# Patient Record
Sex: FEMALE | Race: WHITE | Employment: UNEMPLOYED | URBAN - METROPOLITAN AREA
[De-identification: names, ages, dates, MRNs, and addresses within clinical notes are randomized per-mention and may not be internally consistent; named-entity substitution may affect disease eponyms.]

---

## 2019-04-01 ENCOUNTER — OFFICE VISIT (OUTPATIENT)
Dept: NEUROLOGY | Age: 64
End: 2019-04-01
Payer: COMMERCIAL

## 2019-04-01 VITALS
BODY MASS INDEX: 37.15 KG/M2 | WEIGHT: 189.2 LBS | DIASTOLIC BLOOD PRESSURE: 65 MMHG | HEART RATE: 97 BPM | SYSTOLIC BLOOD PRESSURE: 98 MMHG | HEIGHT: 60 IN

## 2019-04-01 DIAGNOSIS — R42 VERTIGO: ICD-10-CM

## 2019-04-01 DIAGNOSIS — I65.02 VERTEBRAL ARTERY NARROWING, LEFT: Primary | ICD-10-CM

## 2019-04-01 PROCEDURE — 99205 OFFICE O/P NEW HI 60 MIN: CPT | Performed by: PSYCHIATRY & NEUROLOGY

## 2019-04-01 RX ORDER — CODEINE PHOSPHATE AND GUAIFENESIN 10; 100 MG/5ML; MG/5ML
SOLUTION ORAL
COMMUNITY
Start: 2019-01-10 | End: 2019-04-01 | Stop reason: ALTCHOICE

## 2019-04-01 RX ORDER — CLOPIDOGREL BISULFATE 75 MG/1
75 TABLET ORAL DAILY
Qty: 30 TABLET | Refills: 5 | Status: SHIPPED | OUTPATIENT
Start: 2019-04-01

## 2019-04-01 RX ORDER — BLOOD SUGAR DIAGNOSTIC
STRIP MISCELLANEOUS
COMMUNITY
Start: 2019-03-07

## 2019-04-01 RX ORDER — BLOOD SUGAR DIAGNOSTIC
STRIP MISCELLANEOUS
COMMUNITY
Start: 2019-01-08

## 2019-04-01 RX ORDER — ATORVASTATIN CALCIUM 10 MG/1
TABLET, FILM COATED ORAL
COMMUNITY
Start: 2019-03-01 | End: 2019-04-01

## 2019-04-01 RX ORDER — NICOTINE POLACRILEX 4 MG/1
20 GUM, CHEWING ORAL DAILY
COMMUNITY
Start: 2019-03-01

## 2019-04-01 RX ORDER — ATORVASTATIN CALCIUM 10 MG/1
20 TABLET, FILM COATED ORAL DAILY
Qty: 30 TABLET | Refills: 5 | Status: SHIPPED | OUTPATIENT
Start: 2019-04-01

## 2019-04-01 RX ORDER — LISINOPRIL 10 MG/1
10 TABLET ORAL
COMMUNITY
End: 2019-04-18

## 2019-04-01 RX ORDER — ASPIRIN 81 MG/1
325 TABLET, CHEWABLE ORAL
COMMUNITY

## 2019-04-01 NOTE — PROGRESS NOTES
Neurocritical Care, Stroke & Neurointerventional Note    [address]   [address],  [address], [address]   P: [phone]  Endovascular Neurosurgery Consult    Pt Name: Nora Villaseñor  MRN: [id number]  [place]: 1955  Date of evaluation: 4/1/2019  Primary Care Physician: Raymond Hartman MD    Reason for evaluation: Stroke     SUBJECTIVE:   History of Chief Complaint:    Nora Villaseñor is a 66 y.o. female who presents with DM II, hyperlipidemia who had posterior circulation stroke and was hospitalized at [facility]    Was treated with ASA and statin 10 mg daily    She had been having shooting pain on the left side     Allergies  is allergic to latex. Medications  Prior to Admission medications    Medication Sig Start Date End Date Taking? Authorizing Provider   cyanocobalamin ([facility] VITAMIN B12) 1000 MCG tablet cyanocobalamin (vit B-12) 1,000 mcg sublingual tablet   Place 1 tablet twice a day by sublingual route. Yes Historical Provider, MD   aspirin 81 MG chewable tablet Take 325 mg by mouth   Yes Historical Provider, MD   atorvastatin (LIPITOR) 10 MG tablet  3/1/19  Yes Historical Provider, MD   metFORMIN (GLUCOPHAGE) 500 MG tablet metformin 500 mg tablet   Take 1 tablet twice a day by oral route.    Yes Historical Provider, MD   omeprazole 20 MG EC tablet  3/1/19  Yes Historical Provider, MD   lisinopril (PRINIVIL;ZESTRIL) 10 MG tablet Take 10 mg by mouth   Yes Historical Provider, MD   ADVOCATE LANCETS [address] [facility]  3/7/19  Yes Historical Provider, MD   ADVOCATE REDI-CODE strip  3/7/19  Yes Historical Provider, MD   DOCOSAHEXAENOIC ACID PO Take 2 g by mouth   Yes Historical Provider, MD   Blood Glucose Calibration (ADVOCATE REDI-CODE+ CONTROL) Low SOLN  1/8/19  Yes Historical Provider, MD   meloxicam (MOBIC) 7.5 MG tablet Take 7.5 mg by mouth daily   Yes Historical Provider, MD   promethazine-codeine ([address] [facility]) 6.25-10 MG/5ML syrup Take 5 mLs by mouth 4 times daily as needed for Cough 6/29/16  Yes HUGO Cutler CNP   albuterol sulfate HFA (PROVENTIL HFA) 108 (90 BASE) MCG/ACT inhaler Inhale 2 puffs into the lungs every 6 hours as needed for Wheezing or Shortness of Breath 6/29/16  Yes HUGO Cutler CNP   Cholecalciferol (VITAMIN D PO) Take 2,000 mg by mouth daily   Yes Historical Provider, MD   ALPRAZolam Audria Husbands) 0.5 MG tablet Take 0.5 mg by mouth nightly as needed for Sleep or Anxiety   Yes Historical Provider, MD   Thiamine HCl (VITAMIN B-1 PO) Take by mouth daily   Yes Historical Provider, MD    Scheduled Meds:  Continuous Infusions:  PRN Meds:.  Past Medical History   has a past medical history of Anxiety, Hyperlipidemia, Hypertension, and Incontinence. Past Surgical History   has a past surgical history that includes Hysterectomy; Cholecystectomy; Tubal ligation; Abdomen surgery; and Rotator cuff repair (Right). Social History   reports that she has been smoking cigarettes. She has been smoking about 1.00 pack per day. She has never used smokeless tobacco.   reports that she does not drink alcohol. reports that she does not use drugs. Family History  family history is not on file. Review of Systems:  CONSTITUTIONAL:  negative for fevers, chills, fatigue and malaise    EYES:  negative for double vision, blurred vision and photophobia     HEENT:  negative for tinnitus, epistaxis and sore throat    RESPIRATORY:  negative for cough, shortness of breath, wheezing    CARDIOVASCULAR:  negative for chest pain, palpitations, syncope, edema    GASTROINTESTINAL:  negative for nausea, vomiting    GENITOURINARY:  negative for incontinence    MUSCULOSKELETAL:  negative for neck or back pain    NEUROLOGICAL:  Negative for weakness and tingling  negative for headaches and dizziness    PSYCHIATRIC:  negative for anxiety      Review of systems otherwise negative.       OBJECTIVE:   Vitals: BP 98/65 (Site: Right Upper Arm, Position: Sitting, Cuff Size: Large Adult)   Pulse 97   Ht 5' (1.524 m)   Wt 189 lb 3.2 oz (85.8 kg)   BMI 36.95 kg/m²   General appearance: Lying in bed, NAD  HEENT: Head: Normocephalic, no lesions, without obvious abnormality. Neck: no adenopathy, no carotid bruit, no JVD, supple, symmetrical, trachea midline and thyroid not enlarged, symmetric, no tenderness/mass/nodules  Lungs: clear to auscultation bilaterally  Heart: regular rate and rhythm, S1, S2 normal, no murmur, click, rub or gallop  Abdomen: soft, non-tender; nondistended, bowel sounds normal  Extremities: extremities normal, atraumatic, no cyanosis or edema    Neurologic: Mental status: Alert, oriented, thought content appropriate, Alert and oriented x 3,   Language/speech: intact language, attention, knowledge  CN: II-XII intact  MOTOR: 5/5 throughout with normal tone and bulk for age  SENSORY: LT and PP symmetrical and intact  COORDINATION: F to N and Gait intact for age    LABS:   No results for input(s): WBC, HGB, HCT, PLT, NA, K, CL, CO2, BUN, CREATININE, MG, PHOS, CALCIUM, PTT, INR, AST, ALT, BILITOT, BILIDIR, NITRU, COLORU, BACTERIA in the last 72 hours. Invalid input(s): PT, WBCU, RBCU, LEUKOCYTESUA  No results for input(s): ALKPHOS, ALT, AST, BILITOT, BILIDIR, LABALBU, AMYLASE, LIPASE in the last 72 hours. RADIOLOGY:   Images were personally reviewed including:  EXAMINATION:  MRI brain with and without contrast    CLINICAL HISTORY:  Balance difficulty.  Dizziness.     COMPARISON:  CT head 1/7/2019    TECHNIQUE:  Multiplanar multisequence MR imaging of the brain performed per protocol prior to and following the uneventful intravenous administration of 17 mL MultiHance    FINDINGS:   No acute intracranial hemorrhage, infarction or mass effect.  Gray-white matter interface appears intact.  Prominence of the ventricles and sulci compatible with volume loss.  Minimal scattered patchy microvascular ischemic changes in the white [facility] focal extra-axial fluid collection or mass.    No pathologic enhancement. IMPRESSION:  1. No acute intracranial process. CLINICAL HISTORY:  Unsteady gait, ataxia, concern for posterior CVA. COMPARISON:  None. Computed tomographic angiography of the head and neck with 3D post processing techniques was performed with helical scanning utilizing rapid intravenous contrast injection of 70 ml of Omnipaque 350. The examination demonstrates atherosclerotic changes of the aortic arch.  There is tight stenosis or occlusion at the origin and proximal left vertebral artery. There is a dominant right vertebral artery.  There is tortuosity of the proximal internal carotid arteries bilaterally.  The common carotid   and external carotid arteries are unremarkable.  There are degenerative changes of the cervical spine C1-C4 5 levels. There is focal tight stenosis of the distal left vertebral artery near its junction with the basilar artery.  No abnormalities of the basilar artery are seen. There is absence of the anterior communicating artery, right posterior communicating artery and the P1 segment of the left posterior cerebral artery.  There is fetal origin of the left posterior cerebral artery from the distal left internal carotid artery.  No abnormalities of the anterior cerebral,   middle cerebral posterior cerebral arteries are seen.  There are calcifications of the distal internal carotid arteries bilaterally without stenosis. IMPRESSION:   1.  Tight stenosis or occlusion at the origin of the left vertebral artery and tight stenosis at the distal left vertebral artery proximal to its junction with the basilar artery.  Doppler ultrasound is recommended to determine antegrade or retrograde flow within the left vertebral artery. 2. Mild atherosclerotic calcification of the aortic arch.   3. No stenosis of the carotid arterial systems bilaterally using the NASCET criteria.    4.  Incomplete [place] of Arnetha Brash with absence of the anterior communicating artery, right posterior communicating artery and the P1 segment of the left posterior cerebral artery. 5. No abnormalities of the cerebral arteries are seen.  No aneurysm or AVM is demonstrated. IMPRESSIONS:   Mae Taylor is a 66 y.o. female who presents with DM II, hyperlipidemia who had posterior circulation with vertigo and visual hallucination, colorful and vivid with imbalance TIA and was hospitalized at [facility]. Was treated with ASA and statin 10 mg daily    She had been having shooting pain on the left side     1. does not have a problem list on file. PLANS:   1. ASA  2. Plavix 75 mg daily  3. Increase lipitor to 40 mg daily   4. would start with an angiogram with neck movement  5. The risk and benefit of the cerebral angiography was explained at length to the patient and her family, including but not limited to vessel injury, X-ray dye allergic reaction, kidney dysfunction, stroke and groin hematoma. Thank you for the interesting evaluation. Further recommendations to follow. Consultation Visit Time:  60 minutes  Patient given educational materials - see patient instructions. Discussed use, benefit, and side effects of prescribed medications. Personally reviewed imaging with patients and all questions answered. Pt voiced understanding. Patient agreed with treatment plan. Follow up as directed below. Greater than 50% of the time was for counseling and providing answer to the patient question. The findings and the plan discussed with the patient and all her questions were answered. Thank you very much for your referral, please do not hesitate to contact me with any questions.     Bonnie Luo MD Pager: [phone]  Stroke, [facility] Stroke [address] [facility], [place]  Pager [phone]  Electronically

## 2019-04-18 ENCOUNTER — HOSPITAL ENCOUNTER (OUTPATIENT)
Dept: INTERVENTIONAL RADIOLOGY/VASCULAR | Age: 64
Discharge: HOME | End: 2019-04-20
Payer: COMMERCIAL

## 2019-04-18 VITALS
HEIGHT: 60 IN | RESPIRATION RATE: 20 BRPM | HEART RATE: 78 BPM | SYSTOLIC BLOOD PRESSURE: 116 MMHG | TEMPERATURE: 98.2 F | BODY MASS INDEX: 35.34 KG/M2 | OXYGEN SATURATION: 99 % | WEIGHT: 180 LBS | DIASTOLIC BLOOD PRESSURE: 46 MMHG

## 2019-04-18 DIAGNOSIS — I72.9 ANEURYSM (HCC): ICD-10-CM

## 2019-04-18 LAB
GFR NON-AFRICAN AMERICAN: >60 ML/MIN
GFR SERPL CREATININE-BSD FRML MDRD: >60 ML/MIN
GFR SERPL CREATININE-BSD FRML MDRD: NORMAL ML/MIN/{1.73_M2}
GLUCOSE BLD-MCNC: 120 MG/DL (ref 74–100)
POC CHLORIDE: 98 MMOL/L (ref 98–107)
POC CREATININE: 0.81 MG/DL (ref 0.51–1.19)
POC HEMATOCRIT: 40 % (ref 36–46)
POC HEMOGLOBIN: 13.7 G/DL (ref 12–16)
POC POTASSIUM: 4.7 MMOL/L (ref 3.5–4.5)
POC SODIUM: 134 MMOL/L (ref 138–146)

## 2019-04-18 PROCEDURE — 84132 ASSAY OF SERUM POTASSIUM: CPT

## 2019-04-18 PROCEDURE — 85014 HEMATOCRIT: CPT

## 2019-04-18 PROCEDURE — C1887 CATHETER, GUIDING: HCPCS

## 2019-04-18 PROCEDURE — 36215 PLACE CATHETER IN ARTERY: CPT | Performed by: PSYCHIATRY & NEUROLOGY

## 2019-04-18 PROCEDURE — C1769 GUIDE WIRE: HCPCS

## 2019-04-18 PROCEDURE — 2709999900 HC NON-CHARGEABLE SUPPLY

## 2019-04-18 PROCEDURE — 6360000004 HC RX CONTRAST MEDICATION: Performed by: PSYCHIATRY & NEUROLOGY

## 2019-04-18 PROCEDURE — 82435 ASSAY OF BLOOD CHLORIDE: CPT

## 2019-04-18 PROCEDURE — 36226 PLACE CATH VERTEBRAL ART: CPT | Performed by: PSYCHIATRY & NEUROLOGY

## 2019-04-18 PROCEDURE — C1760 CLOSURE DEV, VASC: HCPCS

## 2019-04-18 PROCEDURE — 7100000011 HC PHASE II RECOVERY - ADDTL 15 MIN

## 2019-04-18 PROCEDURE — 7100000010 HC PHASE II RECOVERY - FIRST 15 MIN

## 2019-04-18 PROCEDURE — 82565 ASSAY OF CREATININE: CPT

## 2019-04-18 PROCEDURE — 2580000003 HC RX 258: Performed by: PSYCHIATRY & NEUROLOGY

## 2019-04-18 PROCEDURE — 99213 OFFICE O/P EST LOW 20 MIN: CPT | Performed by: PSYCHIATRY & NEUROLOGY

## 2019-04-18 PROCEDURE — 82947 ASSAY GLUCOSE BLOOD QUANT: CPT

## 2019-04-18 PROCEDURE — 84295 ASSAY OF SERUM SODIUM: CPT

## 2019-04-18 PROCEDURE — 75710 ARTERY X-RAYS ARM/LEG: CPT | Performed by: PSYCHIATRY & NEUROLOGY

## 2019-04-18 PROCEDURE — C1894 INTRO/SHEATH, NON-LASER: HCPCS

## 2019-04-18 PROCEDURE — 6360000002 HC RX W HCPCS: Performed by: NEUROLOGICAL SURGERY

## 2019-04-18 PROCEDURE — 99152 MOD SED SAME PHYS/QHP 5/>YRS: CPT | Performed by: PSYCHIATRY & NEUROLOGY

## 2019-04-18 RX ORDER — SODIUM CHLORIDE 9 MG/ML
INJECTION, SOLUTION INTRAVENOUS CONTINUOUS
Status: DISCONTINUED | OUTPATIENT
Start: 2019-04-18 | End: 2019-04-21 | Stop reason: HOSPADM

## 2019-04-18 RX ORDER — IODIXANOL 270 MG/ML
70 INJECTION, SOLUTION INTRAVASCULAR
Status: COMPLETED | OUTPATIENT
Start: 2019-04-18 | End: 2019-04-18

## 2019-04-18 RX ORDER — MIDAZOLAM HYDROCHLORIDE 1 MG/ML
INJECTION INTRAMUSCULAR; INTRAVENOUS
Status: COMPLETED | OUTPATIENT
Start: 2019-04-18 | End: 2019-04-18

## 2019-04-18 RX ORDER — LISINOPRIL AND HYDROCHLOROTHIAZIDE 20; 12.5 MG/1; MG/1
1 TABLET ORAL DAILY
COMMUNITY

## 2019-04-18 RX ORDER — FENTANYL CITRATE 50 UG/ML
INJECTION, SOLUTION INTRAMUSCULAR; INTRAVENOUS
Status: COMPLETED | OUTPATIENT
Start: 2019-04-18 | End: 2019-04-18

## 2019-04-18 RX ORDER — ACETAMINOPHEN 325 MG/1
650 TABLET ORAL EVERY 4 HOURS PRN
Status: DISCONTINUED | OUTPATIENT
Start: 2019-04-18 | End: 2019-04-21 | Stop reason: HOSPADM

## 2019-04-18 RX ADMIN — IODIXANOL 70 ML: 270 INJECTION, SOLUTION INTRAVASCULAR at 15:53

## 2019-04-18 RX ADMIN — SODIUM CHLORIDE: 9 INJECTION, SOLUTION INTRAVENOUS at 11:45

## 2019-04-18 RX ADMIN — FENTANYL CITRATE 75 MCG: 50 INJECTION INTRAMUSCULAR; INTRAVENOUS at 14:57

## 2019-04-18 RX ADMIN — MIDAZOLAM HYDROCHLORIDE 1.5 MG: 1 INJECTION, SOLUTION INTRAMUSCULAR; INTRAVENOUS at 14:58

## 2019-04-18 RX ADMIN — Medication 2 G: at 14:59

## 2019-04-18 NOTE — H&P
Endovascular Neurosurgery Consult    Pt Name: Ginny Corona  MRN: [id number]  [place]: 1955  Date of evaluation: 4/18/2019  Primary Care Physician: Katie Coronado MD  Reason for evaluation: left vert stenosis    SUBJECTIVE:   History of Chief Complaint:    Ginny Corona is a 66 y.o. female with hx of DM and HTN who had hx of vertigo in 1/2019 foe which she was seen at [facility] and CTA showed left vert at origin severe stenosis and distal stenosis. Patient now on ASA and Plavix and denies similar events. No weakness, numbness and vision loss. Allergies  is allergic to latex. Medications  Prior to Admission medications    Medication Sig Start Date End Date Taking? Authorizing Provider   lisinopril-hydrochlorothiazide (PRINZIDE;ZESTORETIC) 20-12.5 MG per tablet Take 1 tablet by mouth daily   Yes Historical Provider, MD   cyanocobalamin ([facility] VITAMIN B12) 1000 MCG tablet cyanocobalamin (vit B-12) 1,000 mcg sublingual tablet   Place 1 tablet twice a day by sublingual route. Yes Historical Provider, MD   aspirin 81 MG chewable tablet Take 325 mg by mouth   Yes Historical Provider, MD   metFORMIN (GLUCOPHAGE) 500 MG tablet metformin 500 mg tablet   Take 1 tablet twice a day by oral route.    Yes Historical Provider, MD   omeprazole 20 MG EC tablet Take 20 mg by mouth daily  3/1/19  Yes Historical Provider, MD   [facility] [address] [facility]  3/7/19  Yes Historical Provider, MD   Blood Glucose Calibration (ADVOCATE REDI-CODE+ CONTROL) Low SOLN  1/8/19  Yes Historical Provider, MD   clopidogrel (PLAVIX) 75 MG tablet Take 1 tablet by mouth daily 4/1/19  Yes Sandy Dhaliwal MD   atorvastatin (LIPITOR) 10 MG tablet Take 2 tablets by mouth daily 4/1/19  Yes Sandy Dhaliwal MD   albuterol sulfate HFA (PROVENTIL HFA) 108 (90 BASE) MCG/ACT inhaler Inhale 2 puffs into the lungs every 6 hours as needed for Wheezing or Shortness of Breath 6/29/16  Yes HUGO Quinones - KEVIN   ALPRAZolam [facility]) 0.5 MG tablet Take 0.5 mg by mouth nightly as needed for Sleep or Anxiety   Yes Historical Provider, MD   ADVOCATE REDI-CODE strip  3/7/19   Historical Provider, MD   meloxicam (MOBIC) 7.5 MG tablet Take 7.5 mg by mouth daily    Historical Provider, MD   promethazine-codeine (PHENERGAN WITH CODEINE) 6.25-10 MG/5ML syrup Take 5 mLs by mouth 4 times daily as needed for Cough 6/29/16   HUGO Reese - CNP   Cholecalciferol (VITAMIN D PO) Take 2,000 mg by mouth daily    Historical Provider, MD    Scheduled Meds:  Continuous Infusions:   sodium chloride 75 mL/hr at 04/18/19 1145     PRN Meds:.  Past Medical History   has a past medical history of Anxiety, Hyperlipidemia, Hypertension, Incontinence, Posterior circulation stroke ([facility] [address] and Tobacco abuse. Past Surgical History   has a past surgical history that includes Hysterectomy; Cholecystectomy; Tubal ligation; Abdomen surgery; Rotator cuff repair (Right); other surgical history (04/18/2019); and Colonoscopy. Social History   reports that she has been smoking cigarettes. She has been smoking about 1.00 pack per day. She has never used smokeless tobacco.   reports that she does not drink alcohol. reports that she does not use drugs. Family History  family history is not on file. Review of Systems:  CONSTITUTIONAL:  negative for fevers, chills, fatigue and malaise    EYES:  negative for double vision, blurred vision and photophobia     HEENT:  negative for tinnitus, epistaxis and sore throat    RESPIRATORY:  negative for cough, shortness of breath, wheezing    CARDIOVASCULAR:  negative for chest pain, palpitations, syncope, edema    GASTROINTESTINAL:  negative for nausea, vomiting    GENITOURINARY:  negative for incontinence    MUSCULOSKELETAL:  negative for neck or back pain    NEUROLOGICAL:  Negative for weakness and tingling  negative for headaches and dizziness    PSYCHIATRIC:  negative for anxiety      Review of systems otherwise negative.       OBJECTIVE:   Vitals: BP (!) 105/58   Pulse 78   Temp 98.2 °F (36.8 °C) (Temporal)   Resp 18   Ht 5' (1.524 m)   Wt 180 lb (81.6 kg)   SpO2 95%   BMI 35.15 kg/m²     General appearance: Lying in bed, NAD,  Lungs: CTAB  Heart: RRR  Abdomen: soft, NTND, bowel sounds normal  Mallampati: 2    Neuro exam: Follows simple commands. CN II-XII: no gross facial assymmetry, pupils 2 mm reactive to light bilaterally, EOMI, VFF. Bharathi spontaneously against gravity. LABS:     Recent Labs     04/18/19  1155   CREATININE 0.81     No results for input(s): ALKPHOS, ALT, AST, BILITOT, BILIDIR, LABALBU, AMYLASE, LIPASE in the last 72 hours. RADIOLOGY:   Images were personally reviewed including:  As per HPI    IMPRESSIONS:   66years old with hx of TIA of vertigo in the setting of severe left vert stenosis. PLANS:   Cerebral angiogram with iv moderate sedation.  Risks and benefits discussed including but not limited to bruising, stroke, sah, death, retroperitoneal hematoma, femoral pseudoaneurysm, lower ext and renal as well as peripheral vasc compromise discussed - informed consent obtained at bedside from the patient    Ramila Peña MD  Pager [phone]  Stroke, [facility]  [address] [place]  Electronically signed 4/18/2019 at 2:22 PM

## 2019-04-18 NOTE — OP NOTE
Masha  Stroke, Neurocritical Care & [address] [facility]  [address] [place]  Electronically signed 4/18/2019 at 3:44 PM

## 2019-04-18 NOTE — PROGRESS NOTES
Received post cerebral angiogram procedure to [facility] room 11. Assessment obtained. Restrictions reviewed with patient. Post procedure pathway initiated. Right groin site soft , band aid dry and intact. No hematoma noted. Family at side. Patient without complaints. Head of bed flat with right leg straight.

## 2019-04-18 NOTE — PROGRESS NOTES
Patient admitted, consent signed and questions answered. Patient ready for procedure. Call light to reach with side rails up 2 of 2. Family at bedside with patient. History and physical needed and awaiting resident.

## 2019-04-29 ENCOUNTER — OFFICE VISIT (OUTPATIENT)
Dept: NEUROLOGY | Age: 64
End: 2019-04-29
Payer: COMMERCIAL

## 2019-04-29 VITALS
DIASTOLIC BLOOD PRESSURE: 62 MMHG | WEIGHT: 191 LBS | HEART RATE: 93 BPM | SYSTOLIC BLOOD PRESSURE: 99 MMHG | BODY MASS INDEX: 37.3 KG/M2

## 2019-04-29 DIAGNOSIS — I65.02 VERTEBRAL ARTERY NARROWING, LEFT: Primary | ICD-10-CM

## 2019-04-29 PROCEDURE — 99213 OFFICE O/P EST LOW 20 MIN: CPT | Performed by: PSYCHIATRY & NEUROLOGY

## 2019-04-29 NOTE — PROGRESS NOTES
by mouth daily 4/1/19  Yes Herson Bailon MD   meloxicam (MOBIC) 7.5 MG tablet Take 7.5 mg by mouth daily   Yes Historical Provider, MD   promethazine-codeine (PHENERGAN WITH CODEINE) 6.25-10 MG/5ML syrup Take 5 mLs by mouth 4 times daily as needed for Cough 6/29/16  Yes Flodruene HUGO Sales - KEVIN   albuterol sulfate HFA (PROVENTIL HFA) 108 (90 BASE) MCG/ACT inhaler Inhale 2 puffs into the lungs every 6 hours as needed for Wheezing or Shortness of Breath 6/29/16  Yes Floydene HUGO Sales - KEVIN   ALPRAZolam Abbie Batch) 0.5 MG tablet Take 0.5 mg by mouth nightly as needed for Sleep or Anxiety   Yes Historical Provider, MD   Cholecalciferol (VITAMIN D PO) Take 2,000 mg by mouth daily    Historical Provider, MD    Scheduled Meds:  Continuous Infusions:  PRN Meds:.  Past Medical History   has a past medical history of Anxiety, Hyperlipidemia, Hypertension, Incontinence, Posterior circulation stroke ([facility] [address] and Tobacco abuse. Past Surgical History   has a past surgical history that includes Hysterectomy; Cholecystectomy; Tubal ligation; Abdomen surgery; Rotator cuff repair (Right); other surgical history (04/18/2019); and Colonoscopy. Social History   reports that she has been smoking cigarettes. She has been smoking about 1.00 pack per day. She has never used smokeless tobacco.   reports that she does not drink alcohol. reports that she does not use drugs. Family History  family history is not on file. Review of Systems:  CONSTITUTIONAL:  negative for fevers    EYES:  negative for double vision     HEENT:  negative for tinnitus   RESPIRATORY:  negative for cough, shortness of breath    CARDIOVASCULAR:  negative for chest pain, palpitations   GASTROINTESTINAL:  negative for nausea   GENITOURINARY:  negative for hematuria   MUSCULOSKELETAL:  negative for neck pain    NEUROLOGICAL:  See HPI   PSYCHIATRIC:  negative for anxiety      Review of systems otherwise negative.           OBJECTIVE:  Vitals: BP 99/62   Pulse 93   Wt 191 lb (86.6 kg)   BMI 37.30 kg/m²     On exam today: Patient is AAO x3, following commands. CN II-XII grossly intact, pupils 2 mm reactive to light bilaterally. Normal sensory exam to LT and pain. Muscle strength is 5/5 in 4 extremities. DTRs : +1 in bilateral biceps and knees. Cerebellar exam: No nystagmus. Lungs sounds clear to auscultation bilaterally. Heart exam: normal S1,S2 sounds,RRR,no murmers. Abdomen was soft, NT,ND with positive bowel sounds. Normal bilateral radial and pedal pulses. NIH Stroke Scale Total: 0  Modified Daggett Scale:   Zero: No symptoms at all: 0       LABS:  CBC:   Lab Results   Component Value Date    WBC 11.9 06/29/2016    RBC 4.32 06/29/2016    HGB 12.9 06/29/2016    HCT 39.5 06/29/2016    MCV 91.5 06/29/2016    MCH 29.9 06/29/2016    MCHC 32.7 06/29/2016    RDW 13.6 06/29/2016     06/29/2016    MPV 8.0 06/29/2016     BMP:    Lab Results   Component Value Date     06/29/2016    K 3.6 06/29/2016    CL 95 06/29/2016    CO2 31 06/29/2016    BUN 10 06/29/2016    CREATININE 0.81 04/18/2019    CREATININE 0.73 06/29/2016    CALCIUM 9.3 06/29/2016    GFRAA >60 06/29/2016    LABGLOM >60 04/18/2019    GLUCOSE 103 06/29/2016     I  IMAGING:  Images were personally reviewed including:  As per HPI    ASSESSMENT AND PLAN  66 y.o. female with hx of DM and HTN who had hx of vertigo in 1/2019 for which she was seen at [facility] and CTA showed left vert at origin severe stenosis and distal stenosis. She underwent cerebral angiogram 4/18 that shows no vertebral artery stenosis and the dynamic vertebral artery cerebral angiogram demonstrated no reduction in the intracranial flow. Patient still on  and plavix 75 mg daily. No groin hematoma or swelling    Continue aspirin/statin - there is no indication for Plavix  Return to clinic PRN.     Dinora Eastman MD  Pager [phone]  Stroke, [facility] Stroke [address]  Electronically signed 4/29/2019 at 2:37 PM

## 2021-09-01 ENCOUNTER — HOSPITAL ENCOUNTER (EMERGENCY)
Age: 66
Discharge: HOME | End: 2021-09-01
Attending: STUDENT IN AN ORGANIZED HEALTH CARE EDUCATION/TRAINING PROGRAM
Payer: COMMERCIAL

## 2021-09-01 ENCOUNTER — APPOINTMENT (OUTPATIENT)
Dept: GENERAL RADIOLOGY | Age: 66
End: 2021-09-01
Payer: COMMERCIAL

## 2021-09-01 VITALS
WEIGHT: 180 LBS | BODY MASS INDEX: 35.34 KG/M2 | HEIGHT: 60 IN | RESPIRATION RATE: 18 BRPM | DIASTOLIC BLOOD PRESSURE: 79 MMHG | HEART RATE: 83 BPM | TEMPERATURE: 98 F | OXYGEN SATURATION: 94 % | SYSTOLIC BLOOD PRESSURE: 152 MMHG

## 2021-09-01 DIAGNOSIS — R11.0 NAUSEA: Primary | ICD-10-CM

## 2021-09-01 LAB — GLUCOSE BLD-MCNC: 123 MG/DL (ref 65–105)

## 2021-09-01 PROCEDURE — 82947 ASSAY GLUCOSE BLOOD QUANT: CPT

## 2021-09-01 PROCEDURE — 6370000000 HC RX 637 (ALT 250 FOR IP): Performed by: STUDENT IN AN ORGANIZED HEALTH CARE EDUCATION/TRAINING PROGRAM

## 2021-09-01 PROCEDURE — 71045 X-RAY EXAM CHEST 1 VIEW: CPT

## 2021-09-01 PROCEDURE — 99283 EMERGENCY DEPT VISIT LOW MDM: CPT

## 2021-09-01 RX ORDER — ONDANSETRON 4 MG/1
4 TABLET, ORALLY DISINTEGRATING ORAL ONCE
Status: COMPLETED | OUTPATIENT
Start: 2021-09-01 | End: 2021-09-01

## 2021-09-01 RX ORDER — ONDANSETRON 4 MG/1
4 TABLET, ORALLY DISINTEGRATING ORAL EVERY 8 HOURS PRN
Qty: 12 TABLET | Refills: 0 | Status: SHIPPED | OUTPATIENT
Start: 2021-09-01

## 2021-09-01 RX ADMIN — ONDANSETRON 4 MG: 4 TABLET, ORALLY DISINTEGRATING ORAL at 01:55

## 2021-09-01 ASSESSMENT — ENCOUNTER SYMPTOMS
EYE DISCHARGE: 0
SHORTNESS OF BREATH: 0
EYE REDNESS: 0
ABDOMINAL PAIN: 0
COLOR CHANGE: 0
NAUSEA: 1

## 2021-09-01 NOTE — ED PROVIDER NOTES
[facility]  Emergency Department Encounter     Pt Name: Moses Holden  MRN: [id number]  [place] 1955  Date of evaluation: 9/1/21  PCP:  Franklin Bullock MD    [address] [place] [address]       Chief Complaint   Patient presents with    Ingestion     patient believes that she drank mountain dew that was tainted with gasoline       HISTORY OFPRESENT ILLNESS  (Location/Symptom, Timing/Onset, Context/Setting, Quality, Duration, Modifying Factors,Severity.)      Moses Holden is a 61 y.o. female who presents with concern for ingestion of gasoline. She states she was [facility] that she was concerned may be tainted with gasoline. This was filled from the store however did have a strong smell. Was not working with any gasoline with no open containers around. She denies any shortness of breath, sore throat. She states she did have a mildly upset stomach did not vomit and was not nauseated. Feeling improved currently. Tolerating p.o. intake. PAST MEDICAL / SURGICAL / SOCIAL / FAMILY HISTORY      has a past medical history of Anxiety, Hyperlipidemia, Hypertension, Incontinence, Posterior circulation stroke ([facility] [address] and Tobacco abuse.     has a past surgical history that includes Hysterectomy; Cholecystectomy; Tubal ligation; Abdomen surgery; Rotator cuff repair (Right); other surgical history (04/18/2019); and Colonoscopy.     Social History     Socioeconomic History    Marital status: Single     Spouse name: Not on file    Number of children: Not on file    Years of education: Not on file    Highest education level: Not on file   Occupational History    Not on file   Tobacco Use    Smoking status: Current Every Day Smoker     Packs/day: 1.00     Types: Cigarettes    Smokeless tobacco: Never Used   Vaping Use    Vaping Use: Never used   Substance and Sexual Activity    Alcohol use: No    Drug use: No    Sexual activity: Not on file   Other Topics Concern    Not on file   Social History Narrative    Not on file     Social Determinants of Health     Financial Resource Strain:     Difficulty of Paying Living Expenses:    Food Insecurity:     Worried About Running Out of Food in the Last Year:     [address] in the Last Year:    Transportation Needs:     Lack of Transportation (Medical):  Lack of Transportation (Non-Medical):    Physical Activity:     Days of Exercise per Week:     Minutes of Exercise per Session:    Stress:     Feeling of Stress :    Social Connections:     Frequency of Communication with Friends and Family:     Frequency of Social Gatherings with Friends and Family:     Attends [employer] Services:     Active Member of Clubs or Organizations:     Attends Club or Organization Meetings:     Marital Status:    Intimate Partner Violence:     Fear of Current or Ex-Partner:     Emotionally Abused:     Physically Abused:     Sexually Abused:        History reviewed. No pertinent family history. Allergies:  Latex    Home Medications:  Prior to Admission medications    Medication Sig Start Date End Date Taking? Authorizing Provider   ondansetron (ZOFRAN ODT) 4 MG disintegrating tablet Take 1 tablet by mouth every 8 hours as needed for Nausea 9/1/21  Yes Farideh Breaux DO   lisinopril-hydrochlorothiazide (PRINZIDE;ZESTORETIC) 20-12.5 MG per tablet Take 1 tablet by mouth daily    Historical Provider, MD   cyanocobalamin ([facility] VITAMIN B12) 1000 MCG tablet cyanocobalamin (vit B-12) 1,000 mcg sublingual tablet   Place 1 tablet twice a day by sublingual route. Historical Provider, MD   aspirin 81 MG chewable tablet Take 325 mg by mouth    Historical Provider, MD   metFORMIN (GLUCOPHAGE) 500 MG tablet metformin 500 mg tablet   Take 1 tablet twice a day by oral route.     Historical Provider, MD   omeprazole 20 MG EC tablet Take 20 mg by mouth daily  3/1/19   Historical Provider, MD   [facility]  3/7/19   Historical Provider, MD   ADVOCATE REDI-CODE strip  3/7/19   Historical Provider, MD   Blood Glucose Calibration (ADVOCATE REDI-CODE+ CONTROL) Low SOLN  1/8/19   Historical Provider, MD   clopidogrel (PLAVIX) 75 MG tablet Take 1 tablet by mouth daily 4/1/19   Finesse Pino MD   atorvastatin (LIPITOR) 10 MG tablet Take 2 tablets by mouth daily 4/1/19   Finesse Pino MD   meloxicam (MOBIC) 7.5 MG tablet Take 7.5 mg by mouth daily    Historical Provider, MD   promethazine-codeine (PHENERGAN WITH CODEINE) 6.25-10 MG/5ML syrup Take 5 mLs by mouth 4 times daily as needed for Cough 6/29/16   HUGO Clark CNP   albuterol sulfate HFA (PROVENTIL HFA) 108 (90 BASE) MCG/ACT inhaler Inhale 2 puffs into the lungs every 6 hours as needed for Wheezing or Shortness of Breath 6/29/16   HUGO Clark CNP   Cholecalciferol (VITAMIN D PO) Take 2,000 mg by mouth daily    Historical Provider, MD   ALPRAZolam Neetunighat Marrufo) 0.5 MG tablet Take 0.5 mg by mouth nightly as needed for Sleep or Anxiety    Historical Provider, MD       REVIEW OF SYSTEMS    (2-9 systems for level 4, 10 or more for level 5)      Review of Systems   Constitutional: Negative for chills and fever. Eyes: Negative for discharge and redness. Respiratory: Negative for shortness of breath. Cardiovascular: Negative for chest pain. Gastrointestinal: Positive for nausea. Negative for abdominal pain. Genitourinary: Negative for flank pain. Musculoskeletal: Negative for myalgias. Skin: Negative for color change and rash. Allergic/Immunologic: Positive for environmental allergies. Neurological: Negative for headaches. Psychiatric/Behavioral: Negative for agitation and confusion. PHYSICAL EXAM   (up to 7 for level 4, 8 or more for level 5)     INITIAL VITALS:    height is 5' (1.524 m) and weight is 180 lb (81.6 kg). Her oral temperature is 98 °F (36.7 °C). Her blood pressure is 152/79 (abnormal) and her pulse is 83. Her respiration is 18 and oxygen saturation is 94%. Physical Exam  Vitals and nursing note reviewed. Constitutional:       Appearance: She is well-developed. HENT:      Head: Normocephalic and atraumatic. Nose: Nose normal.      Mouth/Throat:      Mouth: Mucous membranes are moist.   Eyes:      General: No scleral icterus. Conjunctiva/sclera: Conjunctivae normal.      Pupils: Pupils are equal, round, and reactive to light. Cardiovascular:      Rate and Rhythm: Normal rate and regular rhythm. Heart sounds: Normal heart sounds. No murmur heard. No friction rub. No gallop. Pulmonary:      Effort: Pulmonary effort is normal. No respiratory distress. Breath sounds: Normal breath sounds. No wheezing or rales. Abdominal:      Palpations: Abdomen is soft. Tenderness: There is no abdominal tenderness. Musculoskeletal:         General: Normal range of motion. Skin:     General: Skin is warm and dry. Findings: No erythema or rash. Neurological:      Mental Status: She is alert and oriented to person, place, and time. Psychiatric:         Behavior: Behavior normal.         DIFFERENTIAL  DIAGNOSIS     PLAN (LABS / IMAGING / EKG):  Orders Placed This Encounter   Procedures    XR CHEST PORTABLE    POC Glucose Fingerstick       MEDICATIONS ORDERED:  Orders Placed This Encounter   Medications    ondansetron (ZOFRAN-ODT) disintegrating tablet 4 mg    ondansetron (ZOFRAN ODT) 4 MG disintegrating tablet     Sig: Take 1 tablet by mouth every 8 hours as needed for Nausea     Dispense:  12 tablet     Refill:  0       DDX: Hydrocarbon ingestion versus aspiration versus hyperglycemia    Initial MDM/Plan: 61 y.o. female who presents with reported possible hydrocarbon ingestion. Patient well-appearing. Intact airway. Unlikely but possible mild hydrocarbon ingestion. Feeling improved. No vomiting. Will get chest x-ray confirm no aspiration pulmonary edema. Check blood sugar.   Discharge    DIAGNOSTIC RESULTS / EMERGENCY DEPARTMENT

## 2021-09-01 NOTE — ED NOTES
Pt presents to the er c/o nausea pt states that she thinks she drank mountain dew with gasoline in it pt c/o nausea     Flavia You, ZACK  09/01/21 [id number]

## 2023-06-16 ENCOUNTER — HOSPITAL ENCOUNTER (EMERGENCY)
Age: 68
Discharge: HOME | End: 2023-06-16
Attending: EMERGENCY MEDICINE
Payer: MEDICARE

## 2023-06-16 ENCOUNTER — HOSPITAL ENCOUNTER (EMERGENCY)
Age: 68
Discharge: LEFT BEFORE DISCHARGE | End: 2023-06-16

## 2023-06-16 VITALS
TEMPERATURE: 98.9 F | HEART RATE: 80 BPM | DIASTOLIC BLOOD PRESSURE: 70 MMHG | SYSTOLIC BLOOD PRESSURE: 166 MMHG | RESPIRATION RATE: 16 BRPM | OXYGEN SATURATION: 98 %

## 2023-06-16 VITALS
TEMPERATURE: 97.6 F | BODY MASS INDEX: 34.27 KG/M2 | RESPIRATION RATE: 16 BRPM | SYSTOLIC BLOOD PRESSURE: 147 MMHG | OXYGEN SATURATION: 96 % | WEIGHT: 170 LBS | HEIGHT: 59 IN | HEART RATE: 96 BPM | DIASTOLIC BLOOD PRESSURE: 73 MMHG

## 2023-06-16 DIAGNOSIS — L23.7 POISON IVY: Primary | ICD-10-CM

## 2023-06-16 DIAGNOSIS — R03.0 ELEVATED BLOOD PRESSURE READING: ICD-10-CM

## 2023-06-16 PROCEDURE — 96372 THER/PROPH/DIAG INJ SC/IM: CPT

## 2023-06-16 PROCEDURE — 99284 EMERGENCY DEPT VISIT MOD MDM: CPT

## 2023-06-16 PROCEDURE — 6360000002 HC RX W HCPCS: Performed by: PHYSICIAN ASSISTANT

## 2023-06-16 RX ORDER — TRIAMCINOLONE ACETONIDE 1 MG/G
CREAM TOPICAL
Qty: 80 G | Refills: 0 | Status: SHIPPED | OUTPATIENT
Start: 2023-06-16

## 2023-06-16 RX ORDER — PREDNISONE 20 MG/1
20 TABLET ORAL 2 TIMES DAILY
Qty: 10 TABLET | Refills: 0 | Status: SHIPPED | OUTPATIENT
Start: 2023-06-16 | End: 2023-06-21

## 2023-06-16 RX ORDER — DEXAMETHASONE SODIUM PHOSPHATE 10 MG/ML
10 INJECTION, SOLUTION INTRAMUSCULAR; INTRAVENOUS ONCE
Status: COMPLETED | OUTPATIENT
Start: 2023-06-16 | End: 2023-06-16

## 2023-06-16 RX ADMIN — DEXAMETHASONE SODIUM PHOSPHATE 10 MG: 10 INJECTION, SOLUTION INTRAMUSCULAR; INTRAVENOUS at 15:30

## 2023-06-16 ASSESSMENT — PAIN SCALES - GENERAL: PAINLEVEL_OUTOF10: 10

## 2023-06-16 ASSESSMENT — PAIN - FUNCTIONAL ASSESSMENT: PAIN_FUNCTIONAL_ASSESSMENT: 0-10

## 2023-06-16 NOTE — ED PROVIDER NOTES
[address] [place] [address] ED  [facility]      Pt Name: Fernando Hinton  MRN: [id number]  [place] 1955  Date ofevaluation: 6/16/2023  Provider: Andrés Vega PA-C    CHIEF COMPLAINT       Chief Complaint   Patient presents with    Poison Ivy         HISTORY OF PRESENT ILLNESS  (Location/Symptom, Timing/Onset, Context/Setting, Quality, Duration, Modifying Factors, Severity.)   Fernando Hinton is a 74 y.o. female who presents to the emergency department with poison ivy. Patient reports after being out some weeds woke up the next day with itchy rash to her face neck and hands over the last couple days. No fevers or chills. No other. Patient requesting steroid shot. Eye doctor without available to give her 1 so she had to come to the emergency department. Nursing Notes were reviewed. ALLERGIES     Latex    CURRENT MEDICATIONS       Previous Medications    ADVOCATE LANCETS MISC        ADVOCATE REDI-CODE STRIP        ALBUTEROL SULFATE HFA (PROVENTIL HFA) 108 (90 BASE) MCG/ACT INHALER    Inhale 2 puffs into the lungs every 6 hours as needed for Wheezing or Shortness of Breath    ALPRAZOLAM (XANAX) 0.5 MG TABLET    Take 0.5 mg by mouth nightly as needed for Sleep or Anxiety    ASPIRIN 81 MG CHEWABLE TABLET    Take 325 mg by mouth    ATORVASTATIN (LIPITOR) 10 MG TABLET    Take 2 tablets by mouth daily    BLOOD GLUCOSE CALIBRATION (ADVOCATE REDI-CODE+ CONTROL) LOW SOLN        CHOLECALCIFEROL (VITAMIN D PO)    Take 2,000 mg by mouth daily    CLOPIDOGREL (PLAVIX) 75 MG TABLET    Take 1 tablet by mouth daily    CYANOCOBALAMIN ([facility] VITAMIN B12) 1000 MCG TABLET    cyanocobalamin (vit B-12) 1,000 mcg sublingual tablet   Place 1 tablet twice a day by sublingual route.     LISINOPRIL-HYDROCHLOROTHIAZIDE (PRINZIDE;ZESTORETIC) 20-12.5 MG PER TABLET    Take 1 tablet by mouth daily    MELOXICAM (MOBIC) 7.5 MG TABLET    Take 7.5 mg by mouth daily    METFORMIN (GLUCOPHAGE) 500 MG TABLET    metformin 500 mg

## 2023-06-16 NOTE — ED NOTES
Pt presenting to the ED with complaints of poison ivy. Pt reports that she was removing weeds and came in contact with it. Pt does have rash noted to hands and left side of face. Pt requesting steroid shot., Pt is A&ox4.       Giorgi Shafer RN  06/16/23 [id number]

## 2023-06-16 NOTE — DISCHARGE INSTRUCTIONS
Take meds as prescribed. Follow up with doctor  in 3 -4 days.   Return to ER immediately if symptoms worsen or persist.  Follow up with doctor for blood pressure monitoring and evaluation

## 2023-06-19 NOTE — ED PROVIDER NOTES
eMERGENCY dEPARTMENT eNCOUnter   Independent Attestation     Pt Name: Leroy Estrada  MRN: [id number]  [place] 1955  Date of evaluation: 6/19/23     Leroy Estrada is a 74 y.o. female with CC: Poison Ivy        This visit was performed by both a physician and an APC. I performed all aspects of the MDM as documented.       Mabel Zamora MD  Attending Emergency Physician            Mabel Zamora MD  06/19/23 [id number]